# Patient Record
Sex: FEMALE | Race: WHITE | NOT HISPANIC OR LATINO | Employment: FULL TIME | URBAN - NONMETROPOLITAN AREA
[De-identification: names, ages, dates, MRNs, and addresses within clinical notes are randomized per-mention and may not be internally consistent; named-entity substitution may affect disease eponyms.]

---

## 2020-05-26 ENCOUNTER — OFFICE VISIT (OUTPATIENT)
Dept: ENDOCRINOLOGY | Facility: CLINIC | Age: 65
End: 2020-05-26

## 2020-05-26 VITALS
BODY MASS INDEX: 37.33 KG/M2 | HEART RATE: 82 BPM | HEIGHT: 69 IN | SYSTOLIC BLOOD PRESSURE: 140 MMHG | WEIGHT: 252 LBS | DIASTOLIC BLOOD PRESSURE: 68 MMHG

## 2020-05-26 DIAGNOSIS — E55.9 VITAMIN D DEFICIENCY: Primary | ICD-10-CM

## 2020-05-26 DIAGNOSIS — K90.9 INTESTINAL MALABSORPTION, UNSPECIFIED TYPE: ICD-10-CM

## 2020-05-26 DIAGNOSIS — I10 ESSENTIAL HYPERTENSION: ICD-10-CM

## 2020-05-26 DIAGNOSIS — N95.1 MENOPAUSAL SYNDROME (HOT FLASHES): ICD-10-CM

## 2020-05-26 DIAGNOSIS — K21.9 GASTROESOPHAGEAL REFLUX DISEASE WITHOUT ESOPHAGITIS: ICD-10-CM

## 2020-05-26 DIAGNOSIS — R73.01 IMPAIRED FASTING GLUCOSE: ICD-10-CM

## 2020-05-26 DIAGNOSIS — E83.42 HYPOMAGNESEMIA: ICD-10-CM

## 2020-05-26 DIAGNOSIS — R79.89 LOW SERUM PARATHYROID HORMONE (PTH): ICD-10-CM

## 2020-05-26 PROCEDURE — 99244 OFF/OP CNSLTJ NEW/EST MOD 40: CPT | Performed by: INTERNAL MEDICINE

## 2020-05-26 RX ORDER — FOLIC ACID 1 MG/1
1 TABLET ORAL DAILY
COMMUNITY

## 2020-05-26 RX ORDER — ESTRADIOL 0.03 MG/D
1 FILM, EXTENDED RELEASE TRANSDERMAL 2 TIMES WEEKLY
COMMUNITY
Start: 2020-05-12

## 2020-05-26 RX ORDER — TRAMADOL HYDROCHLORIDE 50 MG/1
50 TABLET ORAL 4 TIMES DAILY PRN
COMMUNITY
Start: 2020-05-13

## 2020-05-26 RX ORDER — ADHESIVE TAPE 3"X 2.3 YD
TAPE, NON-MEDICATED TOPICAL
Qty: 30 TABLET | Refills: 11 | Status: SHIPPED | OUTPATIENT
Start: 2020-05-26

## 2020-05-26 RX ORDER — METFORMIN HYDROCHLORIDE 500 MG/1
TABLET, EXTENDED RELEASE ORAL
COMMUNITY
Start: 2020-04-02 | End: 2020-05-26

## 2020-05-26 RX ORDER — LISINOPRIL AND HYDROCHLOROTHIAZIDE 25; 20 MG/1; MG/1
1 TABLET ORAL DAILY
COMMUNITY
Start: 2020-05-08

## 2020-05-26 RX ORDER — ERGOCALCIFEROL 1.25 MG/1
CAPSULE ORAL
Qty: 8 CAPSULE | Refills: 11 | Status: SHIPPED | OUTPATIENT
Start: 2020-05-26

## 2020-05-26 RX ORDER — LIFITEGRAST 50 MG/ML
SOLUTION/ DROPS OPHTHALMIC
COMMUNITY
Start: 2020-05-01

## 2020-05-26 RX ORDER — ERGOCALCIFEROL 1.25 MG/1
CAPSULE ORAL
COMMUNITY
Start: 2020-04-02 | End: 2020-05-26 | Stop reason: SDUPTHER

## 2020-08-05 DIAGNOSIS — K90.9 INTESTINAL MALABSORPTION, UNSPECIFIED TYPE: ICD-10-CM

## 2020-08-05 DIAGNOSIS — R73.01 IMPAIRED FASTING GLUCOSE: ICD-10-CM

## 2020-08-05 DIAGNOSIS — E55.9 VITAMIN D DEFICIENCY: ICD-10-CM

## 2020-08-05 DIAGNOSIS — R79.89 LOW SERUM PARATHYROID HORMONE (PTH): ICD-10-CM

## 2020-08-10 DIAGNOSIS — E55.9 VITAMIN D DEFICIENCY: ICD-10-CM

## 2020-08-10 DIAGNOSIS — R79.89 LOW SERUM PARATHYROID HORMONE (PTH): ICD-10-CM

## 2020-08-10 DIAGNOSIS — R73.01 IMPAIRED FASTING GLUCOSE: ICD-10-CM

## 2020-08-10 DIAGNOSIS — K90.9 INTESTINAL MALABSORPTION, UNSPECIFIED TYPE: ICD-10-CM

## 2020-09-18 ENCOUNTER — OFFICE VISIT (OUTPATIENT)
Dept: OTOLARYNGOLOGY | Age: 65
End: 2020-09-18
Payer: COMMERCIAL

## 2020-09-18 VITALS
TEMPERATURE: 97.1 F | WEIGHT: 246.2 LBS | DIASTOLIC BLOOD PRESSURE: 86 MMHG | SYSTOLIC BLOOD PRESSURE: 138 MMHG | HEART RATE: 82 BPM | HEIGHT: 69 IN | BODY MASS INDEX: 36.46 KG/M2

## 2020-09-18 PROBLEM — K12.0 APHTHOUS STOMATITIS: Status: ACTIVE | Noted: 2020-09-18

## 2020-09-18 PROBLEM — R09.89 GLOBUS SENSATION: Status: ACTIVE | Noted: 2020-09-18

## 2020-09-18 PROBLEM — J39.2 OROPHARYNGEAL LESION: Status: ACTIVE | Noted: 2020-09-18

## 2020-09-18 PROCEDURE — 31575 DIAGNOSTIC LARYNGOSCOPY: CPT | Performed by: PHYSICIAN ASSISTANT

## 2020-09-18 PROCEDURE — 99203 OFFICE O/P NEW LOW 30 MIN: CPT | Performed by: PHYSICIAN ASSISTANT

## 2020-09-18 RX ORDER — ESTRADIOL 0.03 MG/D
1 FILM, EXTENDED RELEASE TRANSDERMAL
COMMUNITY
Start: 2020-05-12

## 2020-09-18 RX ORDER — ERGOCALCIFEROL 1.25 MG/1
CAPSULE ORAL
COMMUNITY
Start: 2020-05-26

## 2020-09-18 RX ORDER — ASPIRIN 81 MG/1
81 TABLET ORAL DAILY
COMMUNITY

## 2020-09-18 RX ORDER — TRAMADOL HYDROCHLORIDE 50 MG/1
50 TABLET ORAL 4 TIMES DAILY PRN
COMMUNITY
Start: 2020-05-13

## 2020-09-18 RX ORDER — LIFITEGRAST 50 MG/ML
SOLUTION/ DROPS OPHTHALMIC
COMMUNITY
Start: 2020-05-01 | End: 2020-09-18

## 2020-09-18 RX ORDER — FOLIC ACID 1 MG/1
1 TABLET ORAL DAILY
COMMUNITY

## 2020-09-18 RX ORDER — LISINOPRIL AND HYDROCHLOROTHIAZIDE 25; 20 MG/1; MG/1
1 TABLET ORAL DAILY
COMMUNITY
Start: 2020-05-08

## 2020-09-18 RX ORDER — CYCLOSPORINE 0.5 MG/ML
1 EMULSION OPHTHALMIC 2 TIMES DAILY
COMMUNITY

## 2020-09-18 RX ORDER — OMEPRAZOLE 20 MG/1
20 CAPSULE, DELAYED RELEASE ORAL
Qty: 60 CAPSULE | Refills: 1 | Status: SHIPPED | OUTPATIENT
Start: 2020-09-18

## 2020-09-18 RX ORDER — MAGNESIUM OXIDE 400 MG/1
TABLET ORAL
COMMUNITY
Start: 2020-08-03

## 2020-09-18 ASSESSMENT — ENCOUNTER SYMPTOMS
FACIAL SWELLING: 0
TROUBLE SWALLOWING: 1
SORE THROAT: 1
SINUS PRESSURE: 0
SINUS PAIN: 0
EYE DISCHARGE: 0
EYE PAIN: 0
VOICE CHANGE: 0
RHINORRHEA: 0

## 2020-09-18 NOTE — ASSESSMENT & PLAN NOTE
Globus sensation with dysphagia -likely from laryngeal pharyngeal reflux disease. Plan: I will place Sd Winchester on omeprazole 20 mg twice daily for 30 days. She is return to clinic in a month for reevaluation. I also advised the patient that we will reevaluate the posterior wall lesion from the oropharyngeal region in approximately 3 months with a repeat laryngoscopy.

## 2020-09-18 NOTE — PROGRESS NOTES
[facility] OTOLARYNGOLOGY/ENT  Liza Pradhan is a pleasant 62-year-old  female that was referred by Aishwarya Anand due to problems with a sublingual mucosal lesion and complaints of a globus sensation. She reports that she has a longstanding history of stomatitis and has been treated for years with Magic mouthwash consisting of lidocaine and Benadryl. She reports this is always worked in the past and recently has had no relief of her pain. She was tried on several medicines by the oral surgeon and dentist without success. Overall she reports that she does not smoke or use tobacco products. She also denies the use of alcohol. In addition the patient complains of a globus sensation that is been going on for the last 2 to 3 months that seems to be worsening. She reports that she is constantly clearing her throat. This was most noticeable because she is an  at [employer]. She admits to occasional issues with swallowing solid foods that has been most notable over the last month or 2. Allergies: Patient has no known allergies. Current Outpatient Medications   Medication Sig Dispense Refill    lisinopril-hydroCHLOROthiazide (PRINZIDE;ZESTORETIC) 20-25 MG per tablet Take 1 tablet by mouth daily      progesterone (PROMETRIUM) 100 MG capsule Take 100 mg by mouth nightly      traMADol (ULTRAM) 50 MG tablet Take 50 mg by mouth 4 times daily as needed.       magnesium oxide (MAG-OX) 400 MG tablet TAKE 1/2 TABLET BY MOUTH ONCE DAILY      vitamin D (ERGOCALCIFEROL) 1.25 MG (81435 UT) CAPS capsule Take 2 capsules per week      Probiotic Product (PROBIOTIC-10 PO) Take by mouth      estradiol 0.025 MG/24HR PTTW Place 1 patch onto the skin Twice a Week      aspirin 81 MG EC tablet Take 81 mg by mouth daily      folic acid (FOLVITE) 1 MG tablet Take 1 mg by mouth daily      cycloSPORINE (RESTASIS) 0.05 % ophthalmic emulsion 1 drop 2 times daily      omeprazole (PRILOSEC) 20 MG delayed release capsule Take 1 capsule by mouth 2 times daily (before meals) 60 capsule 1     No current facility-administered medications for this visit. Past Surgical History:   Procedure Laterality Date    CHOLECYSTECTOMY  1985    FOOT SURGERY  2013    PARTIAL HYSTERECTOMY  1983    TONSILLECTOMY  1975       Past Medical History:   Diagnosis Date    Cold sore     GERD (gastroesophageal reflux disease)     Hypertension        No family history on file. Social History     Tobacco Use    Smoking status: Never Smoker    Smokeless tobacco: Never Used   Substance Use Topics    Alcohol use: Not Currently           REVIEW OF SYSTEMS:  all other systems reviewed and are negative  Review of Systems   Constitutional: Negative for chills and fever. HENT: Positive for mouth sores, sore throat and trouble swallowing. Negative for congestion, dental problem, drooling, ear discharge, ear pain, facial swelling, hearing loss, nosebleeds, postnasal drip, rhinorrhea, sinus pressure, sinus pain, sneezing, tinnitus and voice change. Eyes: Negative for pain and discharge. Comments:     PHYSICAL EXAM:    /86   Pulse 82   Temp 97.1 °F (36.2 °C) (Temporal)   Ht 5' 8.5\" (1.74 m)   Wt 246 lb 3.2 oz (111.7 kg)   BMI 36.89 kg/m²   Body mass index is 36.89 kg/m². General Appearance: well developed  and well nourished  Head/ Face: normocephalic and atraumatic  Vocal Quality: good/ normal  Ears: Right Ear: External: external ears normal Otoscopy Ear Canal: canal clear Otoscopy TM: TM's normal and TM's mobile Left Ear: External: external ears normal Otoscopy Ear Canal: canal clear Otoscopy TM: TM's normal and TM's mobile  Hearing: grossly intact  Nose: see endoscopy report  Neck: supple, mass No and tender No  Thyroid: normal and nodules No   Oral exam demonstrated the patient to have evidence of stomatitis of the sublingual area at the base of the frenulum.   A thorough oral exam demonstrated no scope was advanced to the epiglottis where the cords was visualized. There was no nodularity or masses noted. The cords were mildly inflamed and erythematous with some mild erythema along the posterior wall. The cords were noted be fully functional and symmetrical to exam.  Overall is felt that the globus sensation is secondary to reflux disease. She tolerated the procedure nicely with no complications. Orders Placed This Encounter   Medications    omeprazole (PRILOSEC) 20 MG delayed release capsule     Sig: Take 1 capsule by mouth 2 times daily (before meals)     Dispense:  60 capsule     Refill:  1       Electronically signed by Maria Victoria Molina PA-C on 9/18/20 at 3:05 PM CDT          Please note that this chart was generated using dragon dictation software. Although every effort was made to ensure the accuracy of this automated transcription, some errors in transcription may have occurred.

## 2020-09-18 NOTE — ASSESSMENT & PLAN NOTE
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

## 2020-09-22 NOTE — TELEPHONE ENCOUNTER
Mikki called this morning to advise that she has been notified by her Pharmacy that her Insurance is needing a PA for the medication prescribed by Vipin Real. Please contact patient to discuss. Thank you.

## 2020-09-23 NOTE — TELEPHONE ENCOUNTER
I tried to call the patient back to inform her that her insurance is denying this medication as it is an over the counter medication and she has not took any other acid reflux medication in the past. I told her she can purchase this medication over the counter basically anywhere and if she had any questions or concerns to call me back.

## 2020-10-23 ENCOUNTER — OFFICE VISIT (OUTPATIENT)
Dept: OTOLARYNGOLOGY | Age: 65
End: 2020-10-23
Payer: COMMERCIAL

## 2020-10-23 VITALS
WEIGHT: 245 LBS | BODY MASS INDEX: 36.29 KG/M2 | SYSTOLIC BLOOD PRESSURE: 130 MMHG | DIASTOLIC BLOOD PRESSURE: 78 MMHG | HEIGHT: 69 IN

## 2020-10-23 PROBLEM — K21.9 LARYNGOPHARYNGEAL REFLUX (LPR): Status: ACTIVE | Noted: 2020-10-23

## 2020-10-23 PROCEDURE — 99213 OFFICE O/P EST LOW 20 MIN: CPT | Performed by: PHYSICIAN ASSISTANT

## 2020-10-23 NOTE — PROGRESS NOTES
Alivia Rasmussen is a pleasant 74-year-old  female that presents for a 1 month follow-up due to laryngopharyngeal reflux disease. She reports that the omeprazole is not helping her symptoms however she denies any dysphagia. She still complains of a globus sensation that may be is mildly improved. In addition she reports of having new lesions from her stomatitis that have been unresponsive to the Magic mouthwash as well as other medications. She reports that the lesions seem to be more around her crowns and she is inquiring whether she may have a nickel allergy due to the location. Physical examination: Oral exam demonstrated the patient to have some lesions to the upper palate as well around the mandibular region laterally that is around her dental work. The lesions that were noted to the lower inner lip was not to be resolved. Neck exam demonstrated no masses or any thyromegaly. Impression: Recurring stomatitis of the mouth-unresponsive to medications-questionable allergy to nickel from her prior dental work, laryngeal pharyngeal reflux disease-currently stable with no improvement with initial medications. Plan: The risks, benefits, and options of treating the oral lesions were discussed with the patient. She would like to see Dr. Krishna Olmos for allergy testing to rule out nickel allergy. She does not want to try any other medications due to the failure. I have recommended her to see me back in December for repeat flexible laryngoscopy due to the abnormality appreciated from her last exam.  She was advised that if she develops dysphagia or worsening symptoms, would recommend a referral to GI for upper GI endoscopy. She was reminded to call if she has any further questions or problems.       Electronically signed by Mikey Vang PA-C on 10/23/20 at 11:55 AM CDT

## 2020-12-18 ENCOUNTER — OFFICE VISIT (OUTPATIENT)
Dept: OTOLARYNGOLOGY | Age: 65
End: 2020-12-18
Payer: COMMERCIAL

## 2020-12-18 VITALS
WEIGHT: 246 LBS | SYSTOLIC BLOOD PRESSURE: 126 MMHG | HEIGHT: 69 IN | BODY MASS INDEX: 36.43 KG/M2 | DIASTOLIC BLOOD PRESSURE: 68 MMHG

## 2020-12-18 PROCEDURE — 99213 OFFICE O/P EST LOW 20 MIN: CPT | Performed by: PHYSICIAN ASSISTANT

## 2020-12-18 PROCEDURE — 31575 DIAGNOSTIC LARYNGOSCOPY: CPT | Performed by: PHYSICIAN ASSISTANT

## 2020-12-18 RX ORDER — ESOMEPRAZOLE MAGNESIUM 40 MG/1
40 CAPSULE, DELAYED RELEASE ORAL 2 TIMES DAILY
Qty: 60 CAPSULE | Refills: 2 | Status: SHIPPED | OUTPATIENT
Start: 2020-12-18

## 2020-12-18 ASSESSMENT — ENCOUNTER SYMPTOMS
SINUS PRESSURE: 0
RHINORRHEA: 0
VOICE CHANGE: 0
TROUBLE SWALLOWING: 0
EYE DISCHARGE: 0
SORE THROAT: 0
EYE PAIN: 0
FACIAL SWELLING: 0
SINUS PAIN: 0

## 2020-12-18 NOTE — ASSESSMENT & PLAN NOTE
Laryngopharyngeal reflux disease  Plan: Patient is to return to clinic in 1 month for reevaluation of LPR. She will need a repeat laryngoscopy in 1 year due to the polyp being benign appearing at this time. I will change her medications to Nexium 40 mg twice a day. She is to call that she has any questions or problems.

## 2020-12-18 NOTE — PROGRESS NOTES
Use    Smoking status: Never Smoker    Smokeless tobacco: Never Used   Substance Use Topics    Alcohol use: Not Currently           REVIEW OF SYSTEMS:  all other systems reviewed and are negative  Review of Systems   Constitutional: Negative for chills and fever. HENT: Negative for congestion, dental problem, ear discharge, ear pain, facial swelling, hearing loss, nosebleeds, postnasal drip, rhinorrhea, sinus pressure, sinus pain, sneezing, sore throat, tinnitus, trouble swallowing and voice change. Eyes: Negative for pain and discharge. Comments:     PHYSICAL EXAM:    /68   Ht 5' 8.5\" (1.74 m)   Wt 246 lb (111.6 kg)   BMI 36.86 kg/m²   Body mass index is 36.86 kg/m². General Appearance: well developed  and well nourished  Head/ Face: normocephalic and atraumatic  Vocal Quality: good/ normal  Ears: Right Ear: External: external ears normal Otoscopy Ear Canal: canal clear Otoscopy TM: TM's normal and TM's mobile Left Ear: External: external ears normal Otoscopy Ear Canal: canal clear Otoscopy TM: TM's normal and TM's mobile  Hearing: grossly intact  Nose: see endoscopy report  Neck: supple and mass No  Thyroid: normal    Assessment & Plan:    Problem List Items Addressed This Visit     Laryngopharyngeal reflux (LPR) - Primary     Laryngopharyngeal reflux disease  Plan: Patient is to return to clinic in 1 month for reevaluation of LPR. She will need a repeat laryngoscopy in 1 year due to the polyp being benign appearing at this time. I will change her medications to Nexium 40 mg twice a day. She is to call that she has any questions or problems. Relevant Orders    IA LARYNGOSCOPY FLEXIBLE DIAGNOSTIC (Completed)          Orders Placed This Encounter   Procedures    IA LARYNGOSCOPY FLEXIBLE DIAGNOSTIC     The nares were anesthetized with 4% lidocaine aerosol bilaterally. Each nare was individually evaluated and demonstrated no evidence of nasal polyps bilaterally.   The right nare was utilized for the full procedure. The scope was advanced to the posterior wall of the nasopharyngeal region where the patient was noted to have a small polyp versus a remnant from tonsillar tissue to the posterior wall of the nasopharyngeal region. This not did not appear ulcerative. The scope was advanced to the oropharyngeal region where no additional masses or polyps were appreciated. Scope was advanced to the epiglottis where the vocal cords were well visualized. The vocal cords were somewhat edematous and erythematous and felt to be consistent with breakthrough reflux despite PPI therapy. There was no evidence of vocal cord polyps or masses to exam.  The vocal cords were fully symmetrical and functional.  The patient tolerated the procedure nicely with no complications. Orders Placed This Encounter   Medications    esomeprazole (NEXIUM) 40 MG delayed release capsule     Sig: Take 1 capsule by mouth 2 times daily     Dispense:  60 capsule     Refill:  2       Electronically signed by Josh Carvalho PA-C on 12/18/20 at 5:16 PM CST          Please note that this chart was generated using dragon dictation software. Although every effort was made to ensure the accuracy of this automated transcription, some errors in transcription may have occurred.

## 2021-02-15 ENCOUNTER — VIRTUAL VISIT (OUTPATIENT)
Dept: ENT CLINIC | Age: 66
End: 2021-02-15

## 2021-02-15 DIAGNOSIS — K21.9 LARYNGOPHARYNGEAL REFLUX (LPR): Primary | ICD-10-CM

## 2021-02-15 DIAGNOSIS — R09.89 CHRONIC THROAT CLEARING: ICD-10-CM

## 2021-02-15 PROCEDURE — G2012 BRIEF CHECK IN BY MD/QHP: HCPCS | Performed by: PHYSICIAN ASSISTANT

## 2021-02-15 NOTE — PROGRESS NOTES
Mere Vences (:  1955) is a 63 y.o. female,Established patient, here for evaluation of the following chief complaint(s): 1 Month Follow-Up (GERD/LPR)      ASSESSMENT/PLAN:  1. Laryngopharyngeal reflux (LPR)  2. Chronic throat clearing  PLAN: Due to the pt. still having problems with reflux and throat clearing, I will refer her back to Dr. Yuliet Leonard for repeat EGD. Pt. prefers to wait until this summer for the procedure. I will have her to f/u with me virtually in one month and continue her Nexium bid. No follow-ups on file. SUBJECTIVE/OBJECTIVE:  LEA Landaverde is a pleasant 62 y/o, w/f that presents for one month f/u due to persistent reflux disease. She was switched to Nexium bid due to breakthrough disease on laryngoscopy. Currently she reports of no improvement. She is still having lots of throat clearing but denies any dysphasia. She has had multiple EGD's in the past for the same issue. Review of Systems - deferred    No flowsheet data found. Physical Exam - voice seemed stronger with no throat clearing noted during conversation. Rest deferred due to virtual visit. On this date 02/15/21 I have spent 5 minutes reviewing previous notes, test results and face to face (virtual) with the patient discussing the diagnosis and importance of compliance with the treatment plan as well as documenting on the day of the visit. Teresita Squires is a 63 y.o. female being evaluated by a Virtual Visit (video visit) encounter to address concerns as mentioned above. A caregiver was present when appropriate. Due to this being a TeleHealth encounter (During [facility] public health emergency), evaluation of the following organ systems was limited: Vitals/Constitutional/EENT/Resp/CV/GI//MS/Neuro/Skin/Heme-Lymph-Imm. Pursuant to the emergency declaration under the [address] [place] [address] and the [employer] and [employer] Act, this Virtual Visit was conducted with patient's (and/or legal guardian's) consent, to reduce the patient's risk of exposure to COVID-19 and provide necessary medical care. The patient (and/or legal guardian) has also been advised to contact this office for worsening conditions or problems, and seek emergency medical treatment and/or call 911 if deemed necessary. Patient identification was verified at the start of the visit: Yes    Services were provided through a video synchronous discussion virtually to substitute for in-person clinic visit. Patient was located at home and provider was located in office or at home.      An electronic signature was used to authenticate this note.    --Jessica Todd PA-C     Electronically signed by Jessica Todd PA-C on 2/15/21 at 9:47 AM CST

## 2022-08-10 ENCOUNTER — TELEPHONE (OUTPATIENT)
Dept: NEUROLOGY | Age: 67
End: 2022-08-10

## 2022-08-10 NOTE — TELEPHONE ENCOUNTER
Called pt to get new referral appt scheduled. Pt stated that she didn't want to make an appt at this time. She wanted to wait till she seen a Cardiologist before she made an appt with us.